# Patient Record
Sex: FEMALE | Race: BLACK OR AFRICAN AMERICAN | NOT HISPANIC OR LATINO | URBAN - METROPOLITAN AREA
[De-identification: names, ages, dates, MRNs, and addresses within clinical notes are randomized per-mention and may not be internally consistent; named-entity substitution may affect disease eponyms.]

---

## 2023-06-16 ENCOUNTER — OFFICE VISIT (OUTPATIENT)
Dept: URBAN - METROPOLITAN AREA CLINIC 78 | Facility: CLINIC | Age: 48
End: 2023-06-16
Payer: COMMERCIAL

## 2023-06-16 ENCOUNTER — DASHBOARD ENCOUNTERS (OUTPATIENT)
Age: 48
End: 2023-06-16

## 2023-06-16 VITALS
HEART RATE: 69 BPM | WEIGHT: 160 LBS | RESPIRATION RATE: 17 BRPM | HEIGHT: 65 IN | DIASTOLIC BLOOD PRESSURE: 85 MMHG | BODY MASS INDEX: 26.66 KG/M2 | SYSTOLIC BLOOD PRESSURE: 125 MMHG | TEMPERATURE: 98.2 F

## 2023-06-16 DIAGNOSIS — K59.01 CONSTIPATION: ICD-10-CM

## 2023-06-16 DIAGNOSIS — D32.0 MENINGIOMA, CEREBRAL: ICD-10-CM

## 2023-06-16 DIAGNOSIS — K57.90 DIVERTICULOSIS: ICD-10-CM

## 2023-06-16 DIAGNOSIS — Z12.11 COLON CANCER SCREENING: ICD-10-CM

## 2023-06-16 PROCEDURE — 99203 OFFICE O/P NEW LOW 30 MIN: CPT | Performed by: INTERNAL MEDICINE

## 2023-06-16 PROCEDURE — 99243 OFF/OP CNSLTJ NEW/EST LOW 30: CPT | Performed by: INTERNAL MEDICINE

## 2023-06-16 RX ORDER — LINACLOTIDE 145 UG/1
1 CAPSULE AT LEAST 30 MINUTES BEFORE THE FIRST MEAL OF THE DAY ON AN EMPTY STOMACH CAPSULE, GELATIN COATED ORAL ONCE A DAY
Qty: 30 | OUTPATIENT
Start: 2023-06-16 | End: 2023-07-16

## 2023-06-16 RX ORDER — SODIUM PICOSULFATE, MAGNESIUM OXIDE, AND ANHYDROUS CITRIC ACID 12; 3.5; 1 G/175ML; G/175ML; MG/175ML
175 ML DAY #1 AND 175 ML DAY # 2 LIQUID ORAL ONCE A DAY
Qty: 350 MILLILITER | OUTPATIENT

## 2023-06-16 NOTE — HPI-TODAY'S VISIT:
Patient was referred by Dr. Mckeon  A copy of this document will be sent to the physician.   The patient presents for a surveillance .There is no family history of colon polyps or cancer. Patient denies change in bowel habits, appetite, and weight. Patient denies bleeding per rectum. Last colonoscopy: 2016, no polyps    Longstnding constipation , has not had Bm for 1 month  Now 2-3 x weeks   Hx of reflux improved with diet .History of H. pylori on her endoscopy status post treatment and reportedly confirmation of eradication..  Deneis chest pain  Denies easy brusing  Denies anesthesia complication   Cardiac risk : Dr Tin Esquivel  Cardiac calcium score and Echo done  HTN on Amlodipine , no other meds   Duloxetine for Fibromyalgia   Personal history of Meningioma in brain , has HA . seen by Neurologist  Dr Mandujano ( stable).  No surgery is recommended, conservative management ( Dx Jan 2023)  Labs and me 2023: Hemoglobin 12.6 with a hematocrit of 38.9 with MCV of 81.6 platelet of 233 TSH 2.31 Hemoglobin A1c 5.8

## 2023-07-03 ENCOUNTER — OFFICE VISIT (OUTPATIENT)
Dept: URBAN - METROPOLITAN AREA MEDICAL CENTER 10 | Facility: MEDICAL CENTER | Age: 48
End: 2023-07-03
Payer: COMMERCIAL

## 2023-07-03 DIAGNOSIS — Z12.11 COLON CANCER SCREENING: ICD-10-CM

## 2023-07-03 DIAGNOSIS — D12.0 ADENOMA OF CECUM: ICD-10-CM

## 2023-07-03 PROCEDURE — 45380 COLONOSCOPY AND BIOPSY: CPT | Performed by: INTERNAL MEDICINE

## 2024-07-03 NOTE — PHYSICAL EXAM GASTROINTESTINAL
Abdomen , soft, non-tender, non-distended , no guarding or rigidity , no masses palpable , normal bowel sounds Liver and Spleen , no hepatomegaly present , liver non-tender , spleen not palpable Future Appointments    Encounter Information   Provider Department Appt Notes   7/17/2024 Jose Quintero MD [facility] 3 Week Follow Up     Past Visits    Date Provider Specialty Visit Type Primary Dx   06/26/2024 Jose Quintero MD Family Medicine Office Visit Pneumonia of both lower lobes due to infectious organism